# Patient Record
Sex: MALE | NOT HISPANIC OR LATINO | URBAN - METROPOLITAN AREA
[De-identification: names, ages, dates, MRNs, and addresses within clinical notes are randomized per-mention and may not be internally consistent; named-entity substitution may affect disease eponyms.]

---

## 2017-03-09 ENCOUNTER — EMERGENCY (EMERGENCY)
Facility: HOSPITAL | Age: 32
LOS: 1 days | Discharge: HOME | End: 2017-03-09
Attending: EMERGENCY MEDICINE | Admitting: EMERGENCY MEDICINE
Payer: COMMERCIAL

## 2017-03-09 VITALS
WEIGHT: 189.6 LBS | TEMPERATURE: 97 F | SYSTOLIC BLOOD PRESSURE: 126 MMHG | OXYGEN SATURATION: 98 % | HEIGHT: 66 IN | HEART RATE: 77 BPM | DIASTOLIC BLOOD PRESSURE: 70 MMHG | RESPIRATION RATE: 19 BRPM

## 2017-03-09 DIAGNOSIS — J02.9 ACUTE PHARYNGITIS, UNSPECIFIED: ICD-10-CM

## 2017-03-09 DIAGNOSIS — R09.81 NASAL CONGESTION: ICD-10-CM

## 2017-03-09 DIAGNOSIS — F17.200 NICOTINE DEPENDENCE, UNSPECIFIED, UNCOMPLICATED: ICD-10-CM

## 2017-03-09 DIAGNOSIS — R05 COUGH: ICD-10-CM

## 2017-03-09 DIAGNOSIS — Z88.5 ALLERGY STATUS TO NARCOTIC AGENT: ICD-10-CM

## 2017-03-09 PROBLEM — Z00.00 ENCOUNTER FOR PREVENTIVE HEALTH EXAMINATION: Status: ACTIVE | Noted: 2017-03-09

## 2017-03-09 PROCEDURE — 99283 EMERGENCY DEPT VISIT LOW MDM: CPT | Mod: 25

## 2017-03-09 RX ORDER — DEXAMETHASONE 0.5 MG/5ML
10 ELIXIR ORAL ONCE
Qty: 0 | Refills: 0 | Status: DISCONTINUED | OUTPATIENT
Start: 2017-03-09 | End: 2017-03-10

## 2017-03-09 RX ORDER — IBUPROFEN 200 MG
600 TABLET ORAL ONCE
Qty: 0 | Refills: 0 | Status: COMPLETED | OUTPATIENT
Start: 2017-03-09 | End: 2017-03-09

## 2017-03-09 NOTE — ED PROVIDER NOTE - MEDICAL DECISION MAKING DETAILS
sore throat, dry cough, no airway compromise, no resp distress, no trismus, no stridor, tonsillar erythema, no drooling, likely viral in origin  -decadron, motrin

## 2017-03-09 NOTE — ED PROVIDER NOTE - OBJECTIVE STATEMENT
31M no PMH c/o sore throat. pt states ongoing x6 days. mild nasal congestion and dry cough. no fevers, no sick contacts. no SOB, no voice changes. no drooling. no chest pain. no n/v/d. pt states has not taken any medication so far.  no HA. no dizziness.

## 2017-03-09 NOTE — ED PROVIDER NOTE - PROGRESS NOTE DETAILS
advised smoking cessation, continue motrin prn  I have discussed the discharge plan with the patient. The patient agrees with the plan, as discussed.  The patient understands Emergency Department diagnosis is a preliminary diagnosis often based on limited information and that the patient must adhere to the follow-up plan as discussed.  The patient understands that if the symptoms worsen the patient may return to the Emergency Department at any time for further evaluation and treatment.

## 2017-03-10 PROCEDURE — 99283 EMERGENCY DEPT VISIT LOW MDM: CPT

## 2017-03-10 RX ORDER — DEXAMETHASONE 0.5 MG/5ML
10 ELIXIR ORAL ONCE
Qty: 0 | Refills: 0 | Status: COMPLETED | OUTPATIENT
Start: 2017-03-10 | End: 2017-03-10

## 2017-03-10 RX ADMIN — Medication 600 MILLIGRAM(S): at 00:03

## 2017-03-10 RX ADMIN — Medication 10 MILLIGRAM(S): at 00:03

## 2017-11-14 ENCOUNTER — EMERGENCY (EMERGENCY)
Facility: HOSPITAL | Age: 32
LOS: 1 days | Discharge: HOME | End: 2017-11-14
Attending: EMERGENCY MEDICINE | Admitting: EMERGENCY MEDICINE
Payer: MEDICAID

## 2017-11-14 VITALS
OXYGEN SATURATION: 99 % | RESPIRATION RATE: 17 BRPM | HEART RATE: 77 BPM | DIASTOLIC BLOOD PRESSURE: 75 MMHG | SYSTOLIC BLOOD PRESSURE: 132 MMHG | WEIGHT: 120.37 LBS | TEMPERATURE: 99 F

## 2017-11-14 DIAGNOSIS — N50.812 LEFT TESTICULAR PAIN: ICD-10-CM

## 2017-11-14 DIAGNOSIS — Z88.5 ALLERGY STATUS TO NARCOTIC AGENT: ICD-10-CM

## 2017-11-14 LAB
APPEARANCE UR: CLEAR — SIGNIFICANT CHANGE UP
BILIRUB UR-MCNC: NEGATIVE — SIGNIFICANT CHANGE UP
COLOR SPEC: YELLOW — SIGNIFICANT CHANGE UP
DIFF PNL FLD: NEGATIVE — SIGNIFICANT CHANGE UP
GLUCOSE UR QL: NEGATIVE — SIGNIFICANT CHANGE UP
HIV 1+2 AB+HIV1 P24 AG SERPL QL IA: SIGNIFICANT CHANGE UP
KETONES UR-MCNC: NEGATIVE — SIGNIFICANT CHANGE UP
LEUKOCYTE ESTERASE UR-ACNC: NEGATIVE — SIGNIFICANT CHANGE UP
NITRITE UR-MCNC: NEGATIVE — SIGNIFICANT CHANGE UP
PH UR: 8.5 — HIGH (ref 5–8)
PROT UR-MCNC: NEGATIVE MG/DL — SIGNIFICANT CHANGE UP
SP GR SPEC: 1.01 — SIGNIFICANT CHANGE UP (ref 1–1.03)
UROBILINOGEN FLD QL: 2 E.U./DL

## 2017-11-14 PROCEDURE — 76870 US EXAM SCROTUM: CPT | Mod: 26

## 2017-11-14 PROCEDURE — 87389 HIV-1 AG W/HIV-1&-2 AB AG IA: CPT

## 2017-11-14 PROCEDURE — 76870 US EXAM SCROTUM: CPT

## 2017-11-14 PROCEDURE — 81003 URINALYSIS AUTO W/O SCOPE: CPT

## 2017-11-14 PROCEDURE — 87086 URINE CULTURE/COLONY COUNT: CPT

## 2017-11-14 PROCEDURE — 86780 TREPONEMA PALLIDUM: CPT

## 2017-11-14 PROCEDURE — 99284 EMERGENCY DEPT VISIT MOD MDM: CPT | Mod: 25

## 2017-11-14 PROCEDURE — 99285 EMERGENCY DEPT VISIT HI MDM: CPT

## 2017-11-14 RX ORDER — IBUPROFEN 200 MG
1 TABLET ORAL
Qty: 30 | Refills: 0 | OUTPATIENT
Start: 2017-11-14 | End: 2017-11-24

## 2017-11-14 NOTE — ED PROVIDER NOTE - GENITOURINARY, MLM
External genitalia is normal. no swelling. b/l testes non tender. no palpable masses. no d/c. no lesions.

## 2017-11-14 NOTE — ED PROVIDER NOTE - MEDICAL DECISION MAKING DETAILS
left testicular pain. non tender on exam. pt well appearing. a febrile. no edema. u/s unremarkable. u/a no evidence of infection. motrin, cx's pending and f/u with urology,. return precautions d/w pt

## 2017-11-14 NOTE — ED PROVIDER NOTE - OBJECTIVE STATEMENT
33 y/o male with no sig PMHx c/o left testicular pain x 3 days. pt states dull achy intermittent pain to left teste. pt reports similar sx's in past but usually quickly resolves. no urinary sx's. no testicular masses or swelling. no abd pain, n/v. no back pain. no fever or chills. + monogamous relationship and pt reports no concern for STI's. 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 no further complaints. 31 y/o male with no sig PMHx c/o left testicular pain x 3 days. pt states dull achy intermittent pain to left testes. pt reports similar sx's in past but usually quickly resolves. no urinary sx's. no testicular masses or swelling. no abd pain, n/v. no back pain. no fever or chills. + monogamous relationship and pt reports no concern for STI's. 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 no further complaints.

## 2017-11-14 NOTE — ED ADULT NURSE NOTE - OBJECTIVE STATEMENT
33 YO/M c/o L scrotal pain x3 days. Hx of similar pain in past only lasting "a little bit" but not consistent as present. Denies dysuria, penile discharge, visible abnormalities, swelling, redness, palpable mass, abd or back pain.

## 2017-11-14 NOTE — ED PROVIDER NOTE - ATTENDING CONTRIBUTION TO CARE
33 yo M, otherwise healthy, presents with left testicular dull and achy pain x 3 days. Similar sxs in the past which have resolved. No urinary sxs/ penile dc.  No abd pain, n/v/ fever or chills. Pt AAO, NAD, RRR, abd: soft/NT.  exam per PA note. U/s with no acute pathology and UA with no infection. Pain meds prn and pt to f/up outpt.

## 2017-11-15 LAB
C TRACH RRNA SPEC QL NAA+PROBE: SIGNIFICANT CHANGE UP
CULTURE RESULTS: NO GROWTH — SIGNIFICANT CHANGE UP
N GONORRHOEA RRNA SPEC QL NAA+PROBE: SIGNIFICANT CHANGE UP
SPECIMEN SOURCE: SIGNIFICANT CHANGE UP
SPECIMEN SOURCE: SIGNIFICANT CHANGE UP
T PALLIDUM AB TITR SER: NEGATIVE — SIGNIFICANT CHANGE UP

## 2017-11-17 ENCOUNTER — APPOINTMENT (OUTPATIENT)
Dept: UROLOGY | Facility: CLINIC | Age: 32
End: 2017-11-17

## 2017-11-17 ENCOUNTER — OUTPATIENT (OUTPATIENT)
Dept: OUTPATIENT SERVICES | Facility: HOSPITAL | Age: 32
LOS: 1 days | End: 2017-11-17

## 2017-11-17 DIAGNOSIS — Z78.9 OTHER SPECIFIED HEALTH STATUS: ICD-10-CM

## 2017-11-17 DIAGNOSIS — F17.210 NICOTINE DEPENDENCE, CIGARETTES, UNCOMPLICATED: ICD-10-CM

## 2017-11-17 DIAGNOSIS — N50.819 TESTICULAR PAIN, UNSPECIFIED: ICD-10-CM

## 2017-11-17 LAB
APPEARANCE UR: CLEAR — SIGNIFICANT CHANGE UP
BILIRUB UR-MCNC: NEGATIVE — SIGNIFICANT CHANGE UP
COLOR SPEC: YELLOW — SIGNIFICANT CHANGE UP
DIFF PNL FLD: NEGATIVE — SIGNIFICANT CHANGE UP
GLUCOSE UR QL: NEGATIVE — SIGNIFICANT CHANGE UP
KETONES UR-MCNC: (no result) MG/DL
LEUKOCYTE ESTERASE UR-ACNC: NEGATIVE — SIGNIFICANT CHANGE UP
NITRITE UR-MCNC: NEGATIVE — SIGNIFICANT CHANGE UP
PH UR: 6.5 — SIGNIFICANT CHANGE UP (ref 5–8)
PROT UR-MCNC: NEGATIVE MG/DL — SIGNIFICANT CHANGE UP
SP GR SPEC: 1.02 — SIGNIFICANT CHANGE UP (ref 1–1.03)
UROBILINOGEN FLD QL: 0.2 E.U./DL — SIGNIFICANT CHANGE UP

## 2017-11-18 PROBLEM — N50.819 TESTICULAR PAIN: Status: ACTIVE | Noted: 2017-11-18

## 2017-11-18 LAB
CULTURE RESULTS: NO GROWTH — SIGNIFICANT CHANGE UP
SPECIMEN SOURCE: SIGNIFICANT CHANGE UP

## 2017-11-18 RX ORDER — IBUPROFEN 600 MG/1
600 TABLET, FILM COATED ORAL
Refills: 0 | Status: ACTIVE | COMMUNITY

## 2017-11-21 VITALS
SYSTOLIC BLOOD PRESSURE: 127 MMHG | RESPIRATION RATE: 15 BRPM | DIASTOLIC BLOOD PRESSURE: 61 MMHG | HEIGHT: 66 IN | WEIGHT: 187 LBS | BODY MASS INDEX: 30.05 KG/M2 | TEMPERATURE: 98.3 F | HEART RATE: 72 BPM

## 2017-11-21 DIAGNOSIS — N50.819 TESTICULAR PAIN, UNSPECIFIED: ICD-10-CM

## 2017-11-21 PROBLEM — Z78.9 DOES NOT USE ILLICIT DRUGS: Status: ACTIVE | Noted: 2017-11-21

## 2017-11-21 PROBLEM — Z78.9 SOCIAL ALCOHOL USE: Status: ACTIVE | Noted: 2017-11-21

## 2017-11-21 PROBLEM — F17.210 SMOKES CIGARETTES: Status: ACTIVE | Noted: 2017-11-21

## 2017-11-26 ENCOUNTER — EMERGENCY (EMERGENCY)
Facility: HOSPITAL | Age: 32
LOS: 1 days | Discharge: HOME | End: 2017-11-26
Admitting: EMERGENCY MEDICINE
Payer: MEDICAID

## 2017-11-26 VITALS
HEART RATE: 77 BPM | TEMPERATURE: 98 F | SYSTOLIC BLOOD PRESSURE: 118 MMHG | RESPIRATION RATE: 18 BRPM | DIASTOLIC BLOOD PRESSURE: 75 MMHG | WEIGHT: 187.39 LBS | OXYGEN SATURATION: 99 %

## 2017-11-26 DIAGNOSIS — K11.20 SIALOADENITIS, UNSPECIFIED: ICD-10-CM

## 2017-11-26 DIAGNOSIS — H92.02 OTALGIA, LEFT EAR: ICD-10-CM

## 2017-11-26 DIAGNOSIS — Z88.5 ALLERGY STATUS TO NARCOTIC AGENT: ICD-10-CM

## 2017-11-26 DIAGNOSIS — Z79.1 LONG TERM (CURRENT) USE OF NON-STEROIDAL ANTI-INFLAMMATORIES (NSAID): ICD-10-CM

## 2017-11-26 PROCEDURE — 99283 EMERGENCY DEPT VISIT LOW MDM: CPT

## 2017-11-26 RX ORDER — IBUPROFEN 200 MG
1 TABLET ORAL
Qty: 16 | Refills: 0 | OUTPATIENT
Start: 2017-11-26 | End: 2017-11-30

## 2017-11-26 RX ADMIN — Medication 300 MILLIGRAM(S): at 21:59

## 2017-11-26 NOTE — ED PROVIDER NOTE - MEDICAL DECISION MAKING DETAILS
Patient with questionable parotitis afebrile well appearing, nad and vss. No other acute symptoms. Will tx with oral abx therapy.

## 2017-11-26 NOTE — ED PROVIDER NOTE - NS ED ROS FT
left sided along parotids mild swelling, unilateral, no preauricle swelling, TMI, no dental pain upon palpation, no lymphadenopathy, no cellulitis, no opening to skin.

## 2017-11-26 NOTE — ED ADULT NURSE NOTE - OBJECTIVE STATEMENT
states swelling to jaw area in front of left tragus.  denies pain or hearing difficulty.  nontender to touch. denies fever/chills

## 2017-11-26 NOTE — ED ADULT TRIAGE NOTE - ARRIVAL INFO ADDITIONAL COMMENTS
c/o swelling to right side of face outside ear and behind ear X 2 days. denies ringing, ear pain, jaw or tooth pain

## 2017-12-08 ENCOUNTER — APPOINTMENT (OUTPATIENT)
Dept: UROLOGY | Facility: CLINIC | Age: 32
End: 2017-12-08

## 2021-12-22 NOTE — ED ADULT NURSE NOTE - CAS EDN DISCHARGE ASSESSMENT
Addended by: Steven Vasquez on: 12/22/2021 10:02 AM     Modules accepted: Orders
Alert and oriented to person, place and time

## 2025-07-22 NOTE — ED PROVIDER NOTE - CPE EDP NEURO NORM
BP stable, SBP 100s-140s, at times higher  No acute cardiac complaints  Avoid hypotension  Not presently on medication  Continue monitoring BP   normal...